# Patient Record
Sex: MALE | Race: WHITE | URBAN - METROPOLITAN AREA
[De-identification: names, ages, dates, MRNs, and addresses within clinical notes are randomized per-mention and may not be internally consistent; named-entity substitution may affect disease eponyms.]

---
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
## 2024-05-13 ENCOUNTER — OFFICE VISIT (OUTPATIENT)
Dept: INTERNAL MEDICINE CLINIC | Facility: CLINIC | Age: 24
End: 2024-05-13
Payer: COMMERCIAL

## 2024-05-13 VITALS
WEIGHT: 201 LBS | SYSTOLIC BLOOD PRESSURE: 126 MMHG | BODY MASS INDEX: 30.11 KG/M2 | TEMPERATURE: 97 F | HEIGHT: 68.5 IN | DIASTOLIC BLOOD PRESSURE: 70 MMHG | OXYGEN SATURATION: 98 % | HEART RATE: 79 BPM

## 2024-05-13 DIAGNOSIS — H52.229 REGULAR ASTIGMATISM, UNSPECIFIED LATERALITY: ICD-10-CM

## 2024-05-13 DIAGNOSIS — Z00.00 HEALTH MAINTENANCE EXAMINATION: Primary | ICD-10-CM

## 2024-05-13 DIAGNOSIS — H52.31 ANISOMETROPIA: ICD-10-CM

## 2024-05-13 DIAGNOSIS — L71.9 ROSACEA: ICD-10-CM

## 2024-05-13 DIAGNOSIS — E66.09 CLASS 1 OBESITY DUE TO EXCESS CALORIES WITHOUT SERIOUS COMORBIDITY WITH BODY MASS INDEX (BMI) OF 30.0 TO 30.9 IN ADULT: ICD-10-CM

## 2024-05-13 PROBLEM — R74.8 ELEVATED LIVER ENZYMES: Status: ACTIVE | Noted: 2024-05-13

## 2024-05-13 PROBLEM — E78.5 HYPERLIPIDEMIA: Status: ACTIVE | Noted: 2024-05-13

## 2024-05-13 LAB
ALBUMIN SERPL-MCNC: 4.8 G/DL (ref 3.2–4.8)
ALBUMIN/GLOB SERPL: 1.6 {RATIO} (ref 1–2)
ALP LIVER SERPL-CCNC: 81 U/L
ALT SERPL-CCNC: 73 U/L
ANION GAP SERPL CALC-SCNC: 6 MMOL/L (ref 0–18)
AST SERPL-CCNC: 45 U/L (ref ?–34)
BASOPHILS # BLD AUTO: 0.04 X10(3) UL (ref 0–0.2)
BASOPHILS NFR BLD AUTO: 0.8 %
BILIRUB SERPL-MCNC: 0.6 MG/DL (ref 0.3–1.2)
BUN BLD-MCNC: 12 MG/DL (ref 9–23)
BUN/CREAT SERPL: 12.9 (ref 10–20)
CALCIUM BLD-MCNC: 10.3 MG/DL (ref 8.7–10.4)
CHLORIDE SERPL-SCNC: 104 MMOL/L (ref 98–112)
CHOLEST SERPL-MCNC: 249 MG/DL (ref ?–200)
CO2 SERPL-SCNC: 28 MMOL/L (ref 21–32)
CREAT BLD-MCNC: 0.93 MG/DL
DEPRECATED RDW RBC AUTO: 41.4 FL (ref 35.1–46.3)
EGFRCR SERPLBLD CKD-EPI 2021: 119 ML/MIN/1.73M2 (ref 60–?)
EOSINOPHIL # BLD AUTO: 0.1 X10(3) UL (ref 0–0.7)
EOSINOPHIL NFR BLD AUTO: 1.9 %
ERYTHROCYTE [DISTWIDTH] IN BLOOD BY AUTOMATED COUNT: 13.3 % (ref 11–15)
EST. AVERAGE GLUCOSE BLD GHB EST-MCNC: 97 MG/DL (ref 68–126)
FASTING PATIENT LIPID ANSWER: NO
FASTING STATUS PATIENT QL REPORTED: NO
GLOBULIN PLAS-MCNC: 3 G/DL (ref 2–3.5)
GLUCOSE BLD-MCNC: 87 MG/DL (ref 70–99)
HBA1C MFR BLD: 5 % (ref ?–5.7)
HCT VFR BLD AUTO: 44.3 %
HCV AB SERPL QL IA: NONREACTIVE
HDLC SERPL-MCNC: 66 MG/DL (ref 40–59)
HGB BLD-MCNC: 14.6 G/DL
IMM GRANULOCYTES # BLD AUTO: 0.03 X10(3) UL (ref 0–1)
IMM GRANULOCYTES NFR BLD: 0.6 %
LDLC SERPL CALC-MCNC: 161 MG/DL (ref ?–100)
LYMPHOCYTES # BLD AUTO: 1.74 X10(3) UL (ref 1–4)
LYMPHOCYTES NFR BLD AUTO: 33.5 %
MCH RBC QN AUTO: 28.3 PG (ref 26–34)
MCHC RBC AUTO-ENTMCNC: 33 G/DL (ref 31–37)
MCV RBC AUTO: 85.9 FL
MONOCYTES # BLD AUTO: 0.36 X10(3) UL (ref 0.1–1)
MONOCYTES NFR BLD AUTO: 6.9 %
NEUTROPHILS # BLD AUTO: 2.92 X10 (3) UL (ref 1.5–7.7)
NEUTROPHILS # BLD AUTO: 2.92 X10(3) UL (ref 1.5–7.7)
NEUTROPHILS NFR BLD AUTO: 56.3 %
NONHDLC SERPL-MCNC: 183 MG/DL (ref ?–130)
OSMOLALITY SERPL CALC.SUM OF ELEC: 285 MOSM/KG (ref 275–295)
PLATELET # BLD AUTO: 227 10(3)UL (ref 150–450)
POTASSIUM SERPL-SCNC: 4.4 MMOL/L (ref 3.5–5.1)
PROT SERPL-MCNC: 7.8 G/DL (ref 5.7–8.2)
RBC # BLD AUTO: 5.16 X10(6)UL
SODIUM SERPL-SCNC: 138 MMOL/L (ref 136–145)
TRIGL SERPL-MCNC: 127 MG/DL (ref 30–149)
TSI SER-ACNC: 1.5 MIU/ML (ref 0.55–4.78)
VLDLC SERPL CALC-MCNC: 25 MG/DL (ref 0–30)
WBC # BLD AUTO: 5.2 X10(3) UL (ref 4–11)

## 2024-05-13 PROCEDURE — 80061 LIPID PANEL: CPT | Performed by: FAMILY MEDICINE

## 2024-05-13 PROCEDURE — 84443 ASSAY THYROID STIM HORMONE: CPT | Performed by: FAMILY MEDICINE

## 2024-05-13 PROCEDURE — 83036 HEMOGLOBIN GLYCOSYLATED A1C: CPT | Performed by: FAMILY MEDICINE

## 2024-05-13 PROCEDURE — 90471 IMMUNIZATION ADMIN: CPT | Performed by: FAMILY MEDICINE

## 2024-05-13 PROCEDURE — 99385 PREV VISIT NEW AGE 18-39: CPT | Performed by: FAMILY MEDICINE

## 2024-05-13 PROCEDURE — 86803 HEPATITIS C AB TEST: CPT | Performed by: FAMILY MEDICINE

## 2024-05-13 PROCEDURE — 90715 TDAP VACCINE 7 YRS/> IM: CPT | Performed by: FAMILY MEDICINE

## 2024-05-13 PROCEDURE — 80053 COMPREHEN METABOLIC PANEL: CPT | Performed by: FAMILY MEDICINE

## 2024-05-13 PROCEDURE — 85025 COMPLETE CBC W/AUTO DIFF WBC: CPT | Performed by: FAMILY MEDICINE

## 2024-05-13 PROCEDURE — 87389 HIV-1 AG W/HIV-1&-2 AB AG IA: CPT | Performed by: FAMILY MEDICINE

## 2024-05-13 NOTE — PATIENT INSTRUCTIONS
PATIENT INSTRUCTIONS    Thank you for seeing me today, it was a pleasure taking care of you.  Please check out at the  and schedule a follow up appointment.  Return in about 1 year (around 5/13/2025) for physical .  Please remember that the brandee period for all appointments is 5 minutes. This is to help maximize the amount of time that we can spend together at our visits.    Please get your labs drawn - you may need to schedule a lab appointment if this was not completed at your recent doctor's visit.  The following imaging studies were ordered: None  Please also follow up with the following specialists: Optometry   Please fill out the advance directive information (power of  documents) and bring a copy to our clinic.  Focus on healthy diet and exercise  Portion control - monitor how much you are eating and try to cut back.  Do what you can to make healthy choices (as an example, choose water over soda).  You can try apps such a MyFitnessPal or Weight Watchers to help you monitor your diet and exercise habits.  Exercise - increase your intensity level slowly if you do not exercise regularly. Ideally your goal should eventually be to be moving at a pace where it is hard to have a conversation with someone.  Rosacea - lifestyle modifications    Yung,   Dr. Dwyer

## 2024-05-13 NOTE — ASSESSMENT & PLAN NOTE
Exercise and diet advised.  CBC, CMP, lipid panel, Hba1c, TSH, HIV screen, hepatitis C screen  Tdap today.  Advanced directive information provided.  Advised COVID vaccine.

## 2024-05-13 NOTE — ASSESSMENT & PLAN NOTE
Patient with mild rosacea seen.  Lifestyle modifications discussed - avoid triggers, wear sunscreen.  Offered metronidazole gel, defers for now

## 2024-05-13 NOTE — PROGRESS NOTES
FAMILY MEDICINE CLINIC NOTE    HPI  Adrián Fajardo is a 23 year old male presenting for physical    #Health Maintenance  -Diet: Ok. Recently busy because of graduation.   -Exercise: Occasional   -Lung cancer screen: Not indicated  -Colon cancer screen: Not indicated  -Prostate cancer screen: Not indicated  -Aortic aneurysm screen: Not indicated  -Statin:  Will check lipid panel  -ASA: Not indicated  -HIV screen: Indicated  -Hep C screen: Indicated  -Gonorrhea/chlamydia:  Not indicated  -Syphillis: Not indicated  -TB: Not indicated  -Tobacco/alcohol: Per below  -Depression: PHQ-2 score of 0 (score >/= 3 do PHQ-9)  -Advanced Directive: Indicated    #Immunizations  -Tdap: Indicated  -Flu shot: Not indicated - not season  -PCV13: Not indicated   -PCV20: Not indicated   -PPSV23: Not indicated   -HPV: Not indicated  -RZV (preferred) or VZL: Not indicated   -RSV: Not indicated   -COVID: Indicated    #Anisometropia  #Astigmatism  -optometry Dr Jeevan Chin - needs to see a new optometrist     #Patient Care Team  No care team member to display    ROS  GENERAL: No fever/chills, no recent weight loss   HEENT: No visual changes, no changes in hearing, no sore throats  NECK: No pain, no swelling  RESP: No cough, no SOB  CV: No chest pain, no palpitations  GI: No abd pain, no N/V/D  MSK: No edema, no pain  SKIN: No new rashes  NEURO: No numbness, no tingling, no HA    HEALTH MAINTENANCE CHECKLIST  Health Maintenance Topics with due status: Overdue       Topic Date Due    Annual Physical Never done    DTaP,Tdap,and Td Vaccines 05/31/2022    COVID-19 Vaccine Never done    Annual Depression Screening 01/01/2024       ALLERGIES  Allergies   Allergen Reactions    Pcn [Penicillins] HIVES    Amoxicillin RASH       MEDICATIONS  No current outpatient medications on file.       ACTIVE PROBLEM  Patient Active Problem List   Diagnosis    Regular astigmatism    Anisometropia    Health maintenance examination    Rosacea    Class 1 obesity due  to excess calories without serious comorbidity with body mass index (BMI) of 30.0 to 30.9 in adult       PAST MEDICAL HISTORY  Past Medical History:    Anisometropia    Regular astigmatism    Rosacea       PAST SOCIAL HISTORY  Social History     Socioeconomic History    Marital status: Single     Spouse name: Not on file    Number of children: Not on file    Years of education: Not on file    Highest education level: Not on file   Occupational History    Not on file   Tobacco Use    Smoking status: Never    Smokeless tobacco: Never   Vaping Use    Vaping status: Never Used   Substance and Sexual Activity    Alcohol use: Yes     Comment: Weekends    Drug use: Never    Sexual activity: Never   Other Topics Concern    Not on file   Social History Narrative    Relationships: Single. 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     Origin: From [employer].     Interests: Enjoys sports - watching - Cubs fan. Going on walks. Listening to music. Hang out with friends.     Spiritual: Not Hinduism, not spiritual         Social Determinants of Health     Financial Resource Strain: Not on file   Food Insecurity: Not on file   Transportation Needs: Not on file   Physical Activity: Not on file   Stress: Not on file   Social Connections: Not on file   Housing Stability: Not on file       PAST SURGICAL HISTORY  History reviewed. No pertinent surgical history.    PAST FAMILY HISTORY  Family History   Problem Relation Age of Onset    No Known Problems Mother     No Known Problems Father     No Known Problems Sister     No Known Problems Brother     Hypertension Maternal Grandmother     Hyperlipidemia Maternal Grandmother     Heart Disease Maternal Grandfather     Hypertension Maternal Grandfather     Diabetes Maternal Grandfather     Hyperlipidemia Maternal Grandfather     No Known Problems Paternal Grandmother     No  Known Problems Paternal Grandfather     Colon Cancer Neg     Prostate Cancer Neg        PHYSICAL EXAM  Vitals:    05/13/24 0921   BP: 126/70   Pulse: 79   Temp: 97.2 °F (36.2 °C)   SpO2: 98%   Weight: 201 lb (91.2 kg)   Height: 5' 8.5\" (1.74 m)      Body mass index is 30.12 kg/m².    GENERAL: NAD  HEENT: Moist mucous membranes, no tonsillar swelling or erythema, PERRLA bilat, TM translucent and non-bulging  NECK: Supple, non-tender  RESP: CTAB, no wheezing, no rales, no ronchi  CV: RRR, no murmurs  GI: Soft, non-distended, non-tender. +voluntary guarding (ticklish)  MSK: No edema  SKIN: Warm and dry, bilateral cheeks are erythematous with small papules  NEURO: Answering questions appropriately    LABS  No results found for: \"WBC\", \"HGB\", \"HCT\", \"PLT\", \"NEPERCENT\", \"LYPERCENT\", \"MOPERCENT\", \"EOPERCENT\", \"BAPERCENT\", \"NE\", \"LYMABS\", \"MOABSO\", \"EOABSO\", \"BAABSO\", \"REITCPERCENT\"    No results found for: \"NA\", \"K\", \"CL\", \"CO2\", \"ANIONGAP\", \"BUN\", \"CREATSERUM\", \"BUNCREA\", \"GLU\", \"CA\", \"OSMOCALC\", \"GFRNAA\", \"GFRAA\", \"ALT\", \"AST\", \"ALKPHO\", \"BILT\", \"TP\", \"ALB\", \"GLOBULIN\", \"ELECTAG\", \"FASTING\"      No results found for: \"CHOLEST\", \"TRIG\", \"HDL\", \"LDL\", \"VLDL\", \"TCHDLRATIO\", \"NONHDLC\", \"CHOLHDLRATIO\", \"CALCNONHDL\"     DIAGNOSTICS    ASSESSMENT/PLAN  Problem List Items Addressed This Visit          Endocrine and Metabolic    Class 1 obesity due to excess calories without serious comorbidity with body mass index (BMI) of 30.0 to 30.9 in adult     Diet and exercise advised.  Check labs         Relevant Orders    Comp Metabolic Panel (14)    Hemoglobin A1C    Lipid Panel    TSH W Reflex To Free T4       Eye    Anisometropia     Advise regular follow up with optometry          Regular astigmatism     Advise regular follow up with optometry             Skin    Rosacea     Patient with mild rosacea seen.  Lifestyle modifications discussed - avoid triggers, wear sunscreen.  Offered metronidazole gel, defers for now            Health  Encounters    Health maintenance examination - Primary     Exercise and diet advised.  CBC, CMP, lipid panel, Hba1c, TSH, HIV screen, hepatitis C screen  Tdap today.  Advanced directive information provided.  Advised COVID vaccine.         Relevant Orders    TETANUS, DIPHTHERIA TOXOIDS AND ACELLULAR PERTUSIS VACCINE (TDAP), >7 YEARS, IM USE    CBC With Differential With Platelet    Comp Metabolic Panel (14)    HCV Antibody    Hemoglobin A1C    HIV Ag/Ab Combo    Lipid Panel    TSH W Reflex To Free T4       Return in about 1 year (around 5/13/2025) for physical .    Elijah Dwyer MD  Family Medicine

## 2024-07-22 ENCOUNTER — PATIENT MESSAGE (OUTPATIENT)
Dept: INTERNAL MEDICINE CLINIC | Facility: CLINIC | Age: 24
End: 2024-07-22

## 2024-07-22 DIAGNOSIS — L71.9 ROSACEA: Primary | ICD-10-CM

## 2024-07-22 RX ORDER — METRONIDAZOLE 10 MG/G
1 GEL TOPICAL DAILY
Qty: 60 G | Refills: 3 | Status: SHIPPED | OUTPATIENT
Start: 2024-07-22 | End: 2024-11-19

## 2024-07-22 NOTE — TELEPHONE ENCOUNTER
Message routed to Dr. Dwyer to see if he wants to order metronidazole gel for patient now for his rosacea.

## 2024-07-22 NOTE — TELEPHONE ENCOUNTER
From: Adrián Fajardo  To: Elijah Dwyer  Sent: 7/22/2024 10:49 AM CDT  Subject: Rosacea    Hi Dr   What can I use on my face for the rosacea? Can you prescribe some cream?

## 2024-07-24 ENCOUNTER — PATIENT MESSAGE (OUTPATIENT)
Dept: INTERNAL MEDICINE CLINIC | Facility: CLINIC | Age: 24
End: 2024-07-24

## 2024-07-24 DIAGNOSIS — L71.9 ROSACEA: Primary | ICD-10-CM

## 2024-07-24 RX ORDER — METRONIDAZOLE 7.5 MG/G
1 GEL TOPICAL 2 TIMES DAILY
Qty: 45 G | Refills: 3 | Status: SHIPPED | OUTPATIENT
Start: 2024-07-24

## 2024-07-24 NOTE — TELEPHONE ENCOUNTER
From: Adrián Fajardo  To: Elijah Dwyer  Sent: 7/24/2024 11:11 AM CDT  Subject: Viviane Dwyer,   So our copay is $67 for this script any over the counter or less expensive alternative?

## (undated) NOTE — LETTER
VACCINE ADMINISTRATION RECORD  PARENT / GUARDIAN APPROVAL  Date: 1/10/2020  Vaccine administered to: Edilberto Salas     : 2000    MRN: [id number]    A copy of the appropriate Centers for Disease Control and Prevention Vaccine Information statement

## (undated) NOTE — LETTER
[facility] [employer] of Child Health Examination       Student's Name  Samantha Chapman Da Date     Signature                                                                                                                                              Title                           Date    (If adding dates to the above immu ALLERGIES  (Food, drug, insect, other) MEDICATION  (List all prescribed or taken on a regular basis.)     Diagnosis of asthma?   Child wakes during the night coughing   Yes   No    Yes   No    Loss of function of one of paired organs? (eye/ear/kidney/testic Family History No   Ethnic Minority  No          Signs of Insulin Resistance (hypertension, dyslipidemia, polycystic ovarian syndrome, acanthosis nigricans)    No           At Risk  No   Lead Risk Questionnaire  Req'd for children 6 months thru 6 yrs enrol Controller medication (e.g. inhaled corticosteroid):   No Other   NEEDS/MODIFICATIONS required in the school setting  None DIETARY Needs/Restrictions     None   SPECIAL INSTRUCTIONS/DEVICES e.g. safety glasses, glass eye, chest protector for arrhyt

## (undated) NOTE — LETTER
2019               Virgilio Martell : 2000        [address] [place] [address]         To Whom It May Concern,    Immunization History   Administered Date(s) Administered   • DTAP 2000, 2000, 2002, 20

## (undated) NOTE — LETTER
[facility] [employer] Child Health Examination       Student's Name  Aminta Chapman D Signature                                                                                                                                   Title                           Date     Signature Male School   Grade Level/ID#  [address] COMPLETED AND SIGNED BY PARENT/GUARDIAN AND VERIFIED BY HEALTH CARE PROVIDER    ALLERGIES  (Food, drug, insect, other)  Pcn [Penicillins];  Amoxicillin MEDICATION  (List all prescribed o PHYSICAL EXAMINATION REQUIREMENTS (head circumference if <33 years old):   /66   Pulse 60   Ht 5' 7.75\" (1.721 m)   Wt 88.1 kg (194 lb 2.4 oz)   BMI 29.74 kg/m²     DIABETES SCREENING  BMI>85% age/sex  No And any two of the following:  Family Histo Respiratory Yes                   Diagnosis of Asthma: No Mental Health Yes        Currently Prescribed Asthma Medication:            Quick-relief  medication (e.g. Short Acting Beta Antagonist): No          Controller medication (e.g. inhaled corticostero

## (undated) NOTE — LETTER
VACCINE ADMINISTRATION RECORD  PARENT / GUARDIAN APPROVAL  Date: 2019  Vaccine administered to: Ivy Saravia     : 2000    MRN: [id number]    A copy of the appropriate Centers for Disease Control and Prevention Vaccine Information statement

## (undated) NOTE — LETTER
VACCINE ADMINISTRATION RECORD  PARENT / GUARDIAN APPROVAL  Date: 2018  Vaccine administered to: Alex Harris     : 2000    MRN: [id number]    A copy of the appropriate Centers for Disease Control and Prevention Vaccine Information statement

## (undated) NOTE — LETTER
VACCINE ADMINISTRATION RECORD  PARENT / GUARDIAN APPROVAL  Date: 1/10/2020  Vaccine administered to: Nick Stack     : 2000    MRN: [id number]    A copy of the appropriate Centers for Disease Control and Prevention Vaccine Information statement